# Patient Record
Sex: FEMALE | Race: OTHER | NOT HISPANIC OR LATINO | URBAN - METROPOLITAN AREA
[De-identification: names, ages, dates, MRNs, and addresses within clinical notes are randomized per-mention and may not be internally consistent; named-entity substitution may affect disease eponyms.]

---

## 2020-03-11 ENCOUNTER — EMERGENCY (EMERGENCY)
Facility: HOSPITAL | Age: 70
LOS: 1 days | Discharge: HOME | End: 2020-03-11
Attending: EMERGENCY MEDICINE
Payer: COMMERCIAL

## 2020-03-11 VITALS
HEART RATE: 62 BPM | SYSTOLIC BLOOD PRESSURE: 180 MMHG | RESPIRATION RATE: 18 BRPM | TEMPERATURE: 98 F | DIASTOLIC BLOOD PRESSURE: 61 MMHG | OXYGEN SATURATION: 100 %

## 2020-03-11 VITALS
SYSTOLIC BLOOD PRESSURE: 196 MMHG | TEMPERATURE: 98 F | OXYGEN SATURATION: 97 % | HEIGHT: 63 IN | DIASTOLIC BLOOD PRESSURE: 77 MMHG | HEART RATE: 81 BPM | RESPIRATION RATE: 18 BRPM | WEIGHT: 138.01 LBS

## 2020-03-11 PROCEDURE — 12011 RPR F/E/E/N/L/M 2.5 CM/<: CPT

## 2020-03-11 PROCEDURE — 99283 EMERGENCY DEPT VISIT LOW MDM: CPT | Mod: 25

## 2020-03-11 PROCEDURE — 90471 IMMUNIZATION ADMIN: CPT

## 2020-03-11 PROCEDURE — 90715 TDAP VACCINE 7 YRS/> IM: CPT

## 2020-03-11 RX ORDER — ACETAMINOPHEN 500 MG
975 TABLET ORAL ONCE
Refills: 0 | Status: COMPLETED | OUTPATIENT
Start: 2020-03-11 | End: 2020-03-11

## 2020-03-11 RX ORDER — TETANUS TOXOID, REDUCED DIPHTHERIA TOXOID AND ACELLULAR PERTUSSIS VACCINE, ADSORBED 5; 2.5; 8; 8; 2.5 [IU]/.5ML; [IU]/.5ML; UG/.5ML; UG/.5ML; UG/.5ML
0.5 SUSPENSION INTRAMUSCULAR ONCE
Refills: 0 | Status: COMPLETED | OUTPATIENT
Start: 2020-03-11 | End: 2020-03-11

## 2020-03-11 RX ADMIN — TETANUS TOXOID, REDUCED DIPHTHERIA TOXOID AND ACELLULAR PERTUSSIS VACCINE, ADSORBED 0.5 MILLILITER(S): 5; 2.5; 8; 8; 2.5 SUSPENSION INTRAMUSCULAR at 21:13

## 2020-03-11 NOTE — ED PROVIDER NOTE - OBJECTIVE STATEMENT
69y Female with hx of HTN, HLD presenting after mechanical fall with upper lip laceration. Patient was walking, tripped, fell forwards and landed with outstretched hands, striking her face on the ground. No LOC. Not on blood thinners. Complaining of right hand palm abrasions, right knee abrasion and upper lip laceration. No pain elsewhere. Has been able to ambulate since fall. Remembers everything that happened. No preceding chest pain, syncope, dyspnea, palpitations.

## 2020-03-11 NOTE — ED PROVIDER NOTE - ATTENDING CONTRIBUTION TO CARE
68 yo female with mechanical slip/fall.  + lac inside the mouth.  not on a/c.  no facial TTP, no HA, neuro intact, very well appearing.  lac repair, update tetanus.

## 2020-03-11 NOTE — ED PROVIDER NOTE - NSFOLLOWUPINSTRUCTIONS_ED_ALL_ED_FT
Your diagnosis: lip laceration    Discharge instructions:    - Please follow up with your Primary Care Doctor.    - Tylenol up to 650 mg every 8 hours as needed for pain and/or Motrin up to 600 mg every 6 hours as needed for pain.     - Be sure to return to the ED if you develop new or worsening symptoms. Specific signs and symptoms to be vigilant of: fever or chills, worsening lip pain/swelling/redness, lip drainage.

## 2020-03-11 NOTE — ED PROVIDER NOTE - PATIENT PORTAL LINK FT
You can access the FollowMyHealth Patient Portal offered by [facility] by registering at the following website: [email or web address]. By joining [employer]s FollowMyHealth portal, you will also be able to view your health information using other applications (apps) compatible with our system.

## 2020-03-11 NOTE — ED PROVIDER NOTE - CLINICAL SUMMARY MEDICAL DECISION MAKING FREE TEXT BOX
69y Female with hx of HTN, HLD presenting after mechanical fall with upper lip laceration, right hand and right knee abrasions. Will get CTH, max-face, neck, give adacel, and repair laceration. 69y Female with hx of HTN, HLD presenting after mechanical fall with upper lip laceration, right hand and right knee abrasions. Will give adacel, and repair laceration.

## 2020-03-11 NOTE — ED ADULT NURSE NOTE - OBJECTIVE STATEMENT
2020 pt 69yf aox4 walked in  triage states s/p fall tripped on an elevated sidewalk fell facedown, noted lip lac abrasion to inner lft eye and nasal area, abrasion to lft palm and rt knee, denies loc denies any blood thinners pending eval/gc

## 2020-03-11 NOTE — ED PROVIDER NOTE - PHYSICAL EXAMINATION
GENERAL: no acute distress, non-toxic appearing  HEAD: normocephalic, atraumatic  HEENT: 1 cm laceration to upper lip, no injury to dentition, no septal hematoma, mild ecchymosis around nose  CARDIAC: regular rate and rhythm, normal S1 and S2,  no appreciable murmurs  PULM: clear to ascultation bilaterally, no crackles, rales, rhonchi, or wheezing  GI: abdomen nondistended, soft, nontender, no guarding or rebound tenderness  NEURO: alert and oriented x 3, normal speech, PERRLA, EOMI, no focal motor or sensory deficits, nonantalgic gait  MSK: no visible deformities of arms or legs, no midline spinal ttp, able to range all joints  SKIN: abrasions to right palm and right knee  PSYCH: appropriate mood and affect

## 2020-03-11 NOTE — ED ADULT NURSE NOTE - CAS TRG GENERAL NORM CIRC DET
Problem: Patient Care Overview  Goal: Plan of Care Review  Outcome: Ongoing (interventions implemented as appropriate)  AAO x 4, VSS, no falls noted, fall precautions remain, skin intact, pain assessed, no pain noted, call light within reach, bed wheels locked, bed in lowest position, side rails x 2, safety maintained, NADN, will continue to monitor.       Strong peripheral pulses

## 2020-03-11 NOTE — ED PROVIDER NOTE - NS ED ROS FT
GENERAL: no fever, chills  HEENT: no cough, congestion, odynophagia, dysphagia  CARDIAC: no chest pain, palpitations, lightheadedness  PULM: no dyspnea, wheezing   GI: no abdominal pain, nausea, vomiting, diarrhea, constipation, melena, hematochezia  : no urinary dysuria, frequency, incontinence, hematuria  NEURO: no headache, motor weakness, sensory changes  MSK: no joint or muscle pain  SKIN: + abrasions to right palm, right knee, lip laceration  HEME: no active bleeding, bruising

## 2020-03-27 PROBLEM — Z00.00 ENCOUNTER FOR PREVENTIVE HEALTH EXAMINATION: Status: ACTIVE | Noted: 2020-03-27

## 2020-03-28 ENCOUNTER — INPATIENT (INPATIENT)
Facility: HOSPITAL | Age: 70
LOS: 2 days | Discharge: HOME | DRG: 378 | End: 2020-03-31
Attending: INTERNAL MEDICINE | Admitting: HOSPITALIST
Payer: COMMERCIAL

## 2020-03-28 VITALS
HEART RATE: 90 BPM | SYSTOLIC BLOOD PRESSURE: 159 MMHG | TEMPERATURE: 98 F | OXYGEN SATURATION: 100 % | DIASTOLIC BLOOD PRESSURE: 81 MMHG | WEIGHT: 139.99 LBS | HEIGHT: 62 IN

## 2020-03-28 DIAGNOSIS — K92.2 GASTROINTESTINAL HEMORRHAGE, UNSPECIFIED: ICD-10-CM

## 2020-03-28 LAB
ALBUMIN SERPL ELPH-MCNC: 3.9 G/DL — SIGNIFICANT CHANGE UP (ref 3.3–5)
ALP SERPL-CCNC: 64 U/L — SIGNIFICANT CHANGE UP (ref 40–120)
ALT FLD-CCNC: 22 U/L — SIGNIFICANT CHANGE UP (ref 10–45)
ANION GAP SERPL CALC-SCNC: 11 MMOL/L — SIGNIFICANT CHANGE UP (ref 5–17)
AST SERPL-CCNC: 22 U/L — SIGNIFICANT CHANGE UP (ref 10–40)
BASOPHILS # BLD AUTO: 0.05 K/UL — SIGNIFICANT CHANGE UP (ref 0–0.2)
BASOPHILS NFR BLD AUTO: 0.6 % — SIGNIFICANT CHANGE UP (ref 0–2)
BILIRUB SERPL-MCNC: 0.1 MG/DL — LOW (ref 0.2–1.2)
BLD GP AB SCN SERPL QL: NEGATIVE — SIGNIFICANT CHANGE UP
BUN SERPL-MCNC: 22 MG/DL — SIGNIFICANT CHANGE UP (ref 7–23)
CALCIUM SERPL-MCNC: 9.3 MG/DL — SIGNIFICANT CHANGE UP (ref 8.4–10.5)
CHLORIDE SERPL-SCNC: 104 MMOL/L — SIGNIFICANT CHANGE UP (ref 96–108)
CO2 SERPL-SCNC: 24 MMOL/L — SIGNIFICANT CHANGE UP (ref 22–31)
CREAT SERPL-MCNC: 0.96 MG/DL — SIGNIFICANT CHANGE UP (ref 0.5–1.3)
EOSINOPHIL # BLD AUTO: 0.39 K/UL — SIGNIFICANT CHANGE UP (ref 0–0.5)
EOSINOPHIL NFR BLD AUTO: 4.5 % — SIGNIFICANT CHANGE UP (ref 0–6)
GLUCOSE SERPL-MCNC: 212 MG/DL — HIGH (ref 70–99)
HCT VFR BLD CALC: 23.7 % — LOW (ref 34.5–45)
HGB BLD-MCNC: 7.8 G/DL — LOW (ref 11.5–15.5)
IMM GRANULOCYTES NFR BLD AUTO: 0.7 % — SIGNIFICANT CHANGE UP (ref 0–1.5)
LYMPHOCYTES # BLD AUTO: 3.3 K/UL — SIGNIFICANT CHANGE UP (ref 1–3.3)
LYMPHOCYTES # BLD AUTO: 38 % — SIGNIFICANT CHANGE UP (ref 13–44)
MCHC RBC-ENTMCNC: 31.5 PG — SIGNIFICANT CHANGE UP (ref 27–34)
MCHC RBC-ENTMCNC: 32.9 GM/DL — SIGNIFICANT CHANGE UP (ref 32–36)
MCV RBC AUTO: 95.6 FL — SIGNIFICANT CHANGE UP (ref 80–100)
MONOCYTES # BLD AUTO: 0.68 K/UL — SIGNIFICANT CHANGE UP (ref 0–0.9)
MONOCYTES NFR BLD AUTO: 7.8 % — SIGNIFICANT CHANGE UP (ref 2–14)
NEUTROPHILS # BLD AUTO: 4.2 K/UL — SIGNIFICANT CHANGE UP (ref 1.8–7.4)
NEUTROPHILS NFR BLD AUTO: 48.4 % — SIGNIFICANT CHANGE UP (ref 43–77)
NRBC # BLD: 0 /100 WBCS — SIGNIFICANT CHANGE UP (ref 0–0)
PLATELET # BLD AUTO: 268 K/UL — SIGNIFICANT CHANGE UP (ref 150–400)
POTASSIUM SERPL-MCNC: 4.2 MMOL/L — SIGNIFICANT CHANGE UP (ref 3.5–5.3)
POTASSIUM SERPL-SCNC: 4.2 MMOL/L — SIGNIFICANT CHANGE UP (ref 3.5–5.3)
PROT SERPL-MCNC: 6.7 G/DL — SIGNIFICANT CHANGE UP (ref 6–8.3)
RBC # BLD: 2.48 M/UL — LOW (ref 3.8–5.2)
RBC # FLD: 12.7 % — SIGNIFICANT CHANGE UP (ref 10.3–14.5)
RH IG SCN BLD-IMP: POSITIVE — SIGNIFICANT CHANGE UP
RH IG SCN BLD-IMP: POSITIVE — SIGNIFICANT CHANGE UP
SODIUM SERPL-SCNC: 139 MMOL/L — SIGNIFICANT CHANGE UP (ref 135–145)
WBC # BLD: 8.68 K/UL — SIGNIFICANT CHANGE UP (ref 3.8–10.5)
WBC # FLD AUTO: 8.68 K/UL — SIGNIFICANT CHANGE UP (ref 3.8–10.5)

## 2020-03-28 PROCEDURE — 93010 ELECTROCARDIOGRAM REPORT: CPT

## 2020-03-28 PROCEDURE — 99285 EMERGENCY DEPT VISIT HI MDM: CPT

## 2020-03-28 RX ORDER — PANTOPRAZOLE SODIUM 20 MG/1
80 TABLET, DELAYED RELEASE ORAL ONCE
Refills: 0 | Status: COMPLETED | OUTPATIENT
Start: 2020-03-28 | End: 2020-03-28

## 2020-03-28 RX ORDER — ACETAMINOPHEN 500 MG
975 TABLET ORAL ONCE
Refills: 0 | Status: COMPLETED | OUTPATIENT
Start: 2020-03-28 | End: 2020-03-28

## 2020-03-28 RX ADMIN — PANTOPRAZOLE SODIUM 80 MILLIGRAM(S): 20 TABLET, DELAYED RELEASE ORAL at 21:40

## 2020-03-28 NOTE — ED ADULT NURSE NOTE - OBJECTIVE STATEMENT
68yo female presents with dark stools x5 days. Pt was seen here 2 weeks ago after fall. Pt had persistent rib pain and had been taking Naprosyn for pain. No with 5 days of dark stools and abd pain. Reports feeling dizzy today and had near syncopal episode at home. On arrival to ED, pt ambulatory with steady gait. Denies cp/sob. Respirations even/unlabored. Abd soft. No n/v/d. Reports formed stool. Denies urinary symptoms. Skin WDI.

## 2020-03-28 NOTE — ED PROVIDER NOTE - OBJECTIVE STATEMENT
69 year old female with pmhx HTN, HLD presents to ED c/o dark stool x 5 days and chest pain. Pt reports mechanical fall approx 2 weeks ago which she sustained a laceration to her lip. Since has had intermittent chest pain worsened with cough. She states she also feels as if she has reflux at times. Was taking naproxen for pain but stopped. She noticed dark, black stools over the past 5 days. Denies fevers, chills, abd pain, n/v/d, anticoagulant use

## 2020-03-28 NOTE — ED PROVIDER NOTE - PHYSICAL EXAMINATION
CONSTITUTIONAL: Patient is awake, alert and oriented x 3. Patient is well appearing and in no acute distress.  HEAD: NCAT,   LUNGS: CTA B/L,  HEART: RRR.+S1S2 no murmurs,  ABDOMEN: Soft nd/nt+bs no rebound or guarding.   EXTREMITY: no edema or calf tenderness b/l, FROM upper and lower ext b/l  NEURO: No focal deficits

## 2020-03-28 NOTE — ED PROVIDER NOTE - ATTENDING CONTRIBUTION TO CARE
attending Julito: 69yF h/o HTN, HLD recently on NSAIDs after fall earlier this month p/w 5 days melena and 1 day profound generalized weakness and episodes of near syncope. No AC. Concern for upper GI bleed. Will obtain labs including type and screen, Protonix, reassess for need for transfusion/admission

## 2020-03-29 DIAGNOSIS — R07.9 CHEST PAIN, UNSPECIFIED: ICD-10-CM

## 2020-03-29 DIAGNOSIS — D62 ACUTE POSTHEMORRHAGIC ANEMIA: ICD-10-CM

## 2020-03-29 DIAGNOSIS — I10 ESSENTIAL (PRIMARY) HYPERTENSION: ICD-10-CM

## 2020-03-29 DIAGNOSIS — Z29.9 ENCOUNTER FOR PROPHYLACTIC MEASURES, UNSPECIFIED: ICD-10-CM

## 2020-03-29 DIAGNOSIS — E78.00 PURE HYPERCHOLESTEROLEMIA, UNSPECIFIED: ICD-10-CM

## 2020-03-29 DIAGNOSIS — Z02.9 ENCOUNTER FOR ADMINISTRATIVE EXAMINATIONS, UNSPECIFIED: ICD-10-CM

## 2020-03-29 DIAGNOSIS — K92.2 GASTROINTESTINAL HEMORRHAGE, UNSPECIFIED: ICD-10-CM

## 2020-03-29 LAB
ANION GAP SERPL CALC-SCNC: 12 MMOL/L — SIGNIFICANT CHANGE UP (ref 5–17)
APTT BLD: 28.8 SEC — SIGNIFICANT CHANGE UP (ref 27.5–36.3)
BUN SERPL-MCNC: 18 MG/DL — SIGNIFICANT CHANGE UP (ref 7–23)
CALCIUM SERPL-MCNC: 9.6 MG/DL — SIGNIFICANT CHANGE UP (ref 8.4–10.5)
CHLORIDE SERPL-SCNC: 106 MMOL/L — SIGNIFICANT CHANGE UP (ref 96–108)
CK MB CFR SERPL CALC: 1.5 NG/ML — SIGNIFICANT CHANGE UP (ref 0–3.8)
CK MB CFR SERPL CALC: 1.6 NG/ML — SIGNIFICANT CHANGE UP (ref 0–3.8)
CK SERPL-CCNC: 58 U/L — SIGNIFICANT CHANGE UP (ref 25–170)
CO2 SERPL-SCNC: 24 MMOL/L — SIGNIFICANT CHANGE UP (ref 22–31)
CREAT SERPL-MCNC: 0.88 MG/DL — SIGNIFICANT CHANGE UP (ref 0.5–1.3)
GLUCOSE SERPL-MCNC: 133 MG/DL — HIGH (ref 70–99)
HCT VFR BLD CALC: 26.7 % — LOW (ref 34.5–45)
HGB BLD-MCNC: 8.4 G/DL — LOW (ref 11.5–15.5)
INR BLD: 0.98 RATIO — SIGNIFICANT CHANGE UP (ref 0.88–1.16)
MCHC RBC-ENTMCNC: 29.4 PG — SIGNIFICANT CHANGE UP (ref 27–34)
MCHC RBC-ENTMCNC: 31.5 GM/DL — LOW (ref 32–36)
MCV RBC AUTO: 93.4 FL — SIGNIFICANT CHANGE UP (ref 80–100)
NRBC # BLD: 0 /100 WBCS — SIGNIFICANT CHANGE UP (ref 0–0)
PLATELET # BLD AUTO: 225 K/UL — SIGNIFICANT CHANGE UP (ref 150–400)
POTASSIUM SERPL-MCNC: 4.3 MMOL/L — SIGNIFICANT CHANGE UP (ref 3.5–5.3)
POTASSIUM SERPL-SCNC: 4.3 MMOL/L — SIGNIFICANT CHANGE UP (ref 3.5–5.3)
PROTHROM AB SERPL-ACNC: 11.3 SEC — SIGNIFICANT CHANGE UP (ref 10–13.1)
RBC # BLD: 2.86 M/UL — LOW (ref 3.8–5.2)
RBC # FLD: 13.2 % — SIGNIFICANT CHANGE UP (ref 10.3–14.5)
SODIUM SERPL-SCNC: 142 MMOL/L — SIGNIFICANT CHANGE UP (ref 135–145)
TROPONIN T, HIGH SENSITIVITY RESULT: 10 NG/L — SIGNIFICANT CHANGE UP (ref 0–51)
TROPONIN T, HIGH SENSITIVITY RESULT: 8 NG/L — SIGNIFICANT CHANGE UP (ref 0–51)
TROPONIN T, HIGH SENSITIVITY RESULT: 8 NG/L — SIGNIFICANT CHANGE UP (ref 0–51)
TROPONIN T, HIGH SENSITIVITY RESULT: 9 NG/L — SIGNIFICANT CHANGE UP (ref 0–51)
WBC # BLD: 7.98 K/UL — SIGNIFICANT CHANGE UP (ref 3.8–10.5)
WBC # FLD AUTO: 7.98 K/UL — SIGNIFICANT CHANGE UP (ref 3.8–10.5)

## 2020-03-29 PROCEDURE — 93010 ELECTROCARDIOGRAM REPORT: CPT

## 2020-03-29 PROCEDURE — 99223 1ST HOSP IP/OBS HIGH 75: CPT

## 2020-03-29 PROCEDURE — 74177 CT ABD & PELVIS W/CONTRAST: CPT | Mod: 26

## 2020-03-29 PROCEDURE — 99221 1ST HOSP IP/OBS SF/LOW 40: CPT | Mod: GC

## 2020-03-29 RX ORDER — ATORVASTATIN CALCIUM 80 MG/1
20 TABLET, FILM COATED ORAL AT BEDTIME
Refills: 0 | Status: DISCONTINUED | OUTPATIENT
Start: 2020-03-29 | End: 2020-03-31

## 2020-03-29 RX ORDER — ACETAMINOPHEN 500 MG
650 TABLET ORAL EVERY 6 HOURS
Refills: 0 | Status: DISCONTINUED | OUTPATIENT
Start: 2020-03-29 | End: 2020-03-31

## 2020-03-29 RX ORDER — ONDANSETRON 8 MG/1
4 TABLET, FILM COATED ORAL EVERY 8 HOURS
Refills: 0 | Status: DISCONTINUED | OUTPATIENT
Start: 2020-03-29 | End: 2020-03-31

## 2020-03-29 RX ORDER — PANTOPRAZOLE SODIUM 20 MG/1
40 TABLET, DELAYED RELEASE ORAL EVERY 12 HOURS
Refills: 0 | Status: DISCONTINUED | OUTPATIENT
Start: 2020-03-29 | End: 2020-03-30

## 2020-03-29 RX ORDER — LOSARTAN POTASSIUM 100 MG/1
50 TABLET, FILM COATED ORAL DAILY
Refills: 0 | Status: DISCONTINUED | OUTPATIENT
Start: 2020-03-29 | End: 2020-03-31

## 2020-03-29 RX ORDER — PANTOPRAZOLE SODIUM 20 MG/1
6.4 TABLET, DELAYED RELEASE ORAL
Qty: 80 | Refills: 0 | Status: DISCONTINUED | OUTPATIENT
Start: 2020-03-29 | End: 2020-03-31

## 2020-03-29 RX ORDER — PANTOPRAZOLE SODIUM 20 MG/1
80 TABLET, DELAYED RELEASE ORAL ONCE
Refills: 0 | Status: COMPLETED | OUTPATIENT
Start: 2020-03-29 | End: 2020-03-29

## 2020-03-29 RX ORDER — ATORVASTATIN CALCIUM 80 MG/1
1 TABLET, FILM COATED ORAL
Qty: 0 | Refills: 0 | DISCHARGE

## 2020-03-29 RX ORDER — ONDANSETRON 8 MG/1
4 TABLET, FILM COATED ORAL ONCE
Refills: 0 | Status: COMPLETED | OUTPATIENT
Start: 2020-03-29 | End: 2020-03-29

## 2020-03-29 RX ORDER — SODIUM CHLORIDE 9 MG/ML
1000 INJECTION, SOLUTION INTRAVENOUS
Refills: 0 | Status: DISCONTINUED | OUTPATIENT
Start: 2020-03-29 | End: 2020-03-31

## 2020-03-29 RX ORDER — LOSARTAN POTASSIUM 100 MG/1
1 TABLET, FILM COATED ORAL
Qty: 0 | Refills: 0 | DISCHARGE

## 2020-03-29 RX ADMIN — ONDANSETRON 4 MILLIGRAM(S): 8 TABLET, FILM COATED ORAL at 13:40

## 2020-03-29 RX ADMIN — PANTOPRAZOLE SODIUM 40 MILLIGRAM(S): 20 TABLET, DELAYED RELEASE ORAL at 06:09

## 2020-03-29 RX ADMIN — PANTOPRAZOLE SODIUM 40 MILLIGRAM(S): 20 TABLET, DELAYED RELEASE ORAL at 16:05

## 2020-03-29 RX ADMIN — Medication 1 TABLET(S): at 16:04

## 2020-03-29 RX ADMIN — PANTOPRAZOLE SODIUM 8 MG/HR: 20 TABLET, DELAYED RELEASE ORAL at 21:14

## 2020-03-29 RX ADMIN — LOSARTAN POTASSIUM 50 MILLIGRAM(S): 100 TABLET, FILM COATED ORAL at 08:52

## 2020-03-29 RX ADMIN — SODIUM CHLORIDE 75 MILLILITER(S): 9 INJECTION, SOLUTION INTRAVENOUS at 16:02

## 2020-03-29 RX ADMIN — PANTOPRAZOLE SODIUM 80 MILLIGRAM(S): 20 TABLET, DELAYED RELEASE ORAL at 19:00

## 2020-03-29 RX ADMIN — ATORVASTATIN CALCIUM 20 MILLIGRAM(S): 80 TABLET, FILM COATED ORAL at 21:13

## 2020-03-29 NOTE — H&P ADULT - PROBLEM SELECTOR PLAN 7
Transitions of Care Status:  1.  Name of PCP: Dr. Checo Spann (PMD)  2.  PCP Contacted on Admission: [ ] Y    [x] N    3.  PCP contacted at Discharge: [ ] Y    [ ] N    [ ] N/A  4.  Post-Discharge Appointment Date and Location:  5.  Summary of Handoff given to PCP:

## 2020-03-29 NOTE — H&P ADULT - HISTORY OF PRESENT ILLNESS
69F PMH HTN, HLD, ?prediabetes p/w melena. 69F PMH HTN, HLD, ?prediabetes p/w melena. Patient had mechanical fall 2 weeks ago where [place], [place] forward onto her chest, face, and wrists. Was evaluated in the ED, received sutures to upper lip, and discharged. Developed b/l chest pain after that, occasionally radiating to her shoulders and back, nearly constant, sometime severe, worse with movement (getting up, lying down), not associated with exertion. Was started on naprosyn by her PMD, mild improvement in pain. 5 days ago, developed black, loose stools. Typically 2-3 BMs per day. Has had black stools every day since. Chest pain has continued. Now feeling very fatigued. Had one transient episode up epigastric pain one night that resolved with one dose of PPI. Has been eating normally without issue. No nausea, vomiting, dizziness, palpitations, LE edema. No h/o GI bleeding. Had normal colonoscopy more than 5 years ago. Reports normal CXR conducted 2 days ago outpatient to assess for rib fx.     She has not had cough, fevers, URI symptoms, travel, sick contacts, or known exposure to COVID19+ peoples. Has been self-isolating with .

## 2020-03-29 NOTE — H&P ADULT - NSHPLABSRESULTS_GEN_ALL_CORE
EKG and labs personally reviewed and interpreted.     EKG: NSR, nonspecific T wave abnormalities (unchanged from prior EKG), QTc 405    LABS:                        7.8    8.68  )-----------( 268      ( 28 Mar 2020 22:31 )             23.7     139  |  104  |  22  ----------------------------<  212<H>  4.2   |  24  |  0.96    Ca    9.3      28 Mar 2020 22:31    TPro  6.7  /  Alb  3.9  /  TBili  0.1<L>  /  DBili  x   /  AST  22  /  ALT  22  /  AlkPhos  64  03-28    RADIOLOGY & ADDITIONAL TESTS:  Imaging Personally Reviewed:    Consultant(s) Notes Reviewed:  Yes  Care Discussed with Consultants/Other Providers: Yes  Outpatient and prior hospitalization records reviewed: Yes

## 2020-03-29 NOTE — H&P ADULT - PROBLEM SELECTOR PLAN 2
--suspect UGIB given recent, new NSAID use  --GI consult emailed to assess whether EGD should be performed inpatient vs pt stabilized and defer to outpatient given COVID19 outbreak  --trend CBC following transfusion  --c/w PPI IV BID  --CLD for now as hemodynamics stable and unlikely to have EGD on Sunday.

## 2020-03-29 NOTE — CONSULT NOTE ADULT - SUBJECTIVE AND OBJECTIVE BOX
Chief Complaint:  Patient is a 69y old  Female who presents with a chief complaint of melena (29 Mar 2020 01:53)      HPI:  69 year old female with HTN, HLD, prediabetes presented to the emergency room with chief complain of melena for 5 days.     As per notes, patient had a mechanical fall 2 weeks ago where she [place], fell forward onto her chest, face, and wrists. She was evaluated in the ED, received sutures to upper lip, and was discharged. She developed b/l chest pain and was started on naprosyn by her PMD. She then developed black, loose stools, typically 2-3 BMs per day. She also complains of fatigue and had one transient episode up epigastric pain one night that resolved with one dose of PPI. She has been eating normally without issue. She denies nausea, vomiting, dizziness, palpitations, LE edema.     She had a normal colonoscopy more than 5 years ago. She has never had any prior episodes of GI bleeding. She denies use of blood thinners. She has been taking NSAIDs recently.     Allergies:  No Known Allergies      Home Medications:  Patient Currently Takes Medications as of 29-Mar-2020 01:33 documented in Structured Notes  · 	Naprosyn 375 mg oral tablet: Last Dose Taken:  , 1 tab(s) orally 2 times a day  · 	losartan 50 mg oral tablet: Last Dose Taken:  , 1 tab(s) orally once a day  · 	hydroCHLOROthiazide 12.5 mg oral tablet: Last Dose Taken:  , 1 tab(s) orally once a day  · 	Lipitor 20 mg oral tablet: Last Dose Taken:  , 1 tab(s) orally once a day  · 	Calcium 600+D 600 mg-200 intl units (5 mcg) oral tablet: Last Dose Taken:  , 1 tab(s) orally 2 times a day      Hospital Medications:  acetaminophen   Tablet .. 650 milliGRAM(s) Oral every 6 hours PRN  atorvastatin 20 milliGRAM(s) Oral at bedtime  calcium carbonate 1250 mG  + Vitamin D (OsCal 500 + D) 1 Tablet(s) Oral daily  losartan 50 milliGRAM(s) Oral daily  pantoprazole  Injectable 40 milliGRAM(s) IV Push every 12 hours      PMHX/PSHX:  Hypertension  Hypercholesteremia  Breast lump      Family history:  No pertinent family history in first degree relatives  Denies family history of colon cancer, liver cancer, uterine cancer, ovarian cancer, breast cancer, gastric ulcers, colon polyps, gallstones    Social History:   lives with , retired nurse  independent ADLs, no toxic habits      PHYSICAL EXAM:   patient was not examined by me to limit exposure during COVID pandemic     Vital Signs:  Vital Signs Last 24 Hrs  T(C): 36.5 (29 Mar 2020 08:50), Max: 37.1 (29 Mar 2020 01:48)  T(F): 97.7 (29 Mar 2020 08:50), Max: 98.8 (29 Mar 2020 01:48)  HR: 65 (29 Mar 2020 08:50) (61 - 90)  BP: 153/89 (29 Mar 2020 08:50) (110/66 - 159/81)  BP(mean): --  RR: 18 (29 Mar 2020 08:50) (17 - 19)  SpO2: 99% (29 Mar 2020 08:50) (99% - 100%)  Daily Height in cm: 157.48 (28 Mar 2020 20:12)    Daily     LABS:                        8.4    7.98  )-----------( 225      ( 29 Mar 2020 08:05 )             26.7     03-29    142  |  106  |  18  ----------------------------<  133<H>  4.3   |  24  |  0.88    Ca    9.6      29 Mar 2020 08:04    TPro  6.7  /  Alb  3.9  /  TBili  0.1<L>  /  DBili  x   /  AST  22  /  ALT  22  /  AlkPhos  64  03-28    LIVER FUNCTIONS - ( 28 Mar 2020 22:31 )  Alb: 3.9 g/dL / Pro: 6.7 g/dL / ALK PHOS: 64 U/L / ALT: 22 U/L / AST: 22 U/L / GGT: x           PT/INR - ( 29 Mar 2020 09:14 )   PT: 11.3 sec;   INR: 0.98 ratio         PTT - ( 29 Mar 2020 09:14 )  PTT:28.8 sec        Imaging:

## 2020-03-29 NOTE — CONSULT NOTE ADULT - ATTENDING COMMENTS
Agree with above. Anemia with melena most likely due to NSAID induced gastropathy. Hemodynamically stable. Agree with PPI tx and trend CBC. If continues to drop, consider EGD.

## 2020-03-29 NOTE — H&P ADULT - PROBLEM SELECTOR PLAN 1
--Hgb 7.8 from baseline 13s   --ED transfusing 2 units pRBC  --not on blood thinners  --likely 2/2 UGIB

## 2020-03-29 NOTE — H&P ADULT - PROBLEM SELECTOR PLAN 3
--low suspicion for ACS. Chest wall is tender. Likely secondary to fall. EKG nonischemic. Trop 8. Can check 2nd trop with next lab draw.   --No other COVID symptoms such as fever, cough, myalgias, URI symptoms, neg outpt xray two days ago.   --tylenol for pain.

## 2020-03-29 NOTE — CONSULT NOTE ADULT - ASSESSMENT
69 year old female with HTN, HLD, prediabetes presented to the emergency room with chief complain of melena.     IMPRESSION  - Acute blood loss anemia with melena, Hb 7.8 now s/p 2u pRBC  Likely 2/2 NSAID gastropathy, PUD, vs esophagitis, erosive gastritis, angioectasia    RECOMMENDATION    - trend CBC, CMP, INR, transfuse as needed  - start pantoprazole with bolus 80mg IV followed by infusion at 8mg/hr  - clear liquid diet for now  - please hold NSAIDs  - please send Hpylori serology, if positive then will need to be treated with triple therapy   - supportive care as per primary team    Shaye Valencia, PGY-6  GI fellow  B- [id number]/ [phone]  Please call GI fellow on call after 5pm and on weekends 69 year old female with HTN, HLD, prediabetes presented to the emergency room with chief complain of melena.     IMPRESSION  - Acute blood loss anemia with melena, Hb 7.8 now s/p 2u pRBC  Likely 2/2 NSAID gastropathy, PUD, vs esophagitis, erosive gastritis, angioectasia    - Epigastric and chest pain, likely 2/2 trauma but need to rule out cardiac causes before proceeding with GI workup    RECOMMENDATION    - trend CBC, CMP, INR, transfuse as needed  - start pantoprazole with bolus 80mg IV followed by infusion at 8mg/hr  - clear liquid diet for now  - please hold NSAIDs  - please send Hpylori serology, if positive then will need to be treated with triple therapy   - please check CT abdomen/pelvis with IV/PO contrast  - please obtain an EKG, given persistent epigastric and chest pain   - supportive care as per primary team    Shaye Valencia, PGY-6  GI fellow  B- [id number]/ [phone]  Please call GI fellow on call after 5pm and on weekends

## 2020-03-29 NOTE — CHART NOTE - NSCHARTNOTEFT_GEN_A_CORE
Pt with c/o Epigastric discomfort. Pt seen and examined at bedside. Pt with c/o epigastric discomfort. Stated that it is intermittent.   Spoke with GI Fellow Dr Valencia who recommended CT A/P to determine  reason for discomfort, Protonix drip, Twelve Lead EKG. Also discussed with Medical Attending Dr Dickerson who recommended Cardiac Enzymes x 2.CT Scan and CE negative. Pt in no acute distress.   Will continue to monitor. Endorsed to Night PA for further care.

## 2020-03-29 NOTE — H&P ADULT - NSHPPHYSICALEXAM_GEN_ALL_CORE
Vital Signs Last 24 Hrs  T(C): 36.7 (03-28-20 @ 20:12)  T(F): 98.1 (03-28-20 @ 20:12), Max: 98.1 (03-28-20 @ 20:12)  HR: 90 (03-28-20 @ 20:12) (90 - 90)  BP: 159/81 (03-28-20 @ 20:12)  BP(mean): --  RR: --  SpO2: 100% (03-28-20 @ 20:12) (100% - 100%)  Wt(kg): --    PHYSICAL EXAM:  GENERAL: NAD, well-developed  HEAD:  Atraumatic, Normocephalic  EYES: EOMI, PERRLA, conjunctiva and sclera clear  NECK: Supple, No JVD  CHEST/LUNG: Clear to auscultation bilaterally; No wheeze; chest wall tender to palpation  HEART: Regular rate and rhythm; No murmurs, rubs, or gallops  ABDOMEN: Soft, Nontender, Nondistended; Bowel sounds present  EXTREMITIES:  2+ Peripheral Pulses, No clubbing, cyanosis, or edema  PSYCH: AAOx3  NEUROLOGY: non-focal  SKIN: No rashes or lesions

## 2020-03-29 NOTE — H&P ADULT - NSHPREVIEWOFSYSTEMS_GEN_ALL_CORE
Review of Systems:   CONSTITUTIONAL: +fatigue; No fever, weight loss  EYES: No eye pain, visual disturbances, or discharge  ENMT:  No difficulty hearing, tinnitus, vertigo; No sinus or throat pain  NECK: No pain or stiffness  RESPIRATORY: No cough, wheezing, chills or hemoptysis; No shortness of breath  CARDIOVASCULAR: +chest wall pain; No palpitations, dizziness, or leg swelling  GASTROINTESTINAL: +melena; No abdominal or epigastric pain. No nausea, vomiting, or hematemesis; No diarrhea or constipation. No hematochezia.  GENITOURINARY: No dysuria, frequency, hematuria, or incontinence  NEUROLOGICAL: No headaches, memory loss, loss of strength, numbness, or tremors  SKIN: No itching, burning, rashes, or lesions   MUSCULOSKELETAL: No joint pain or swelling; No muscle, back, or extremity pain  PSYCHIATRIC: No depression, anxiety, mood swings, or difficulty sleeping  HEME/LYMPH: No easy bruising, or bleeding gums  ALLERGY AND IMMUNOLOGIC: No hives or eczema

## 2020-03-30 LAB
ALBUMIN SERPL ELPH-MCNC: 3.6 G/DL — SIGNIFICANT CHANGE UP (ref 3.3–5)
ALP SERPL-CCNC: 59 U/L — SIGNIFICANT CHANGE UP (ref 40–120)
ALT FLD-CCNC: 19 U/L — SIGNIFICANT CHANGE UP (ref 10–45)
ANION GAP SERPL CALC-SCNC: 12 MMOL/L — SIGNIFICANT CHANGE UP (ref 5–17)
AST SERPL-CCNC: 19 U/L — SIGNIFICANT CHANGE UP (ref 10–40)
BASOPHILS # BLD AUTO: 0.04 K/UL — SIGNIFICANT CHANGE UP (ref 0–0.2)
BASOPHILS NFR BLD AUTO: 0.7 % — SIGNIFICANT CHANGE UP (ref 0–2)
BILIRUB SERPL-MCNC: 0.7 MG/DL — SIGNIFICANT CHANGE UP (ref 0.2–1.2)
BUN SERPL-MCNC: 13 MG/DL — SIGNIFICANT CHANGE UP (ref 7–23)
CALCIUM SERPL-MCNC: 9.4 MG/DL — SIGNIFICANT CHANGE UP (ref 8.4–10.5)
CHLORIDE SERPL-SCNC: 105 MMOL/L — SIGNIFICANT CHANGE UP (ref 96–108)
CO2 SERPL-SCNC: 26 MMOL/L — SIGNIFICANT CHANGE UP (ref 22–31)
CREAT SERPL-MCNC: 1.04 MG/DL — SIGNIFICANT CHANGE UP (ref 0.5–1.3)
EOSINOPHIL # BLD AUTO: 0.34 K/UL — SIGNIFICANT CHANGE UP (ref 0–0.5)
EOSINOPHIL NFR BLD AUTO: 5.6 % — SIGNIFICANT CHANGE UP (ref 0–6)
GLUCOSE SERPL-MCNC: 140 MG/DL — HIGH (ref 70–99)
HCT VFR BLD CALC: 29.6 % — LOW (ref 34.5–45)
HCV AB S/CO SERPL IA: 0.23 S/CO — SIGNIFICANT CHANGE UP (ref 0–0.99)
HCV AB SERPL-IMP: SIGNIFICANT CHANGE UP
HGB BLD-MCNC: 9.6 G/DL — LOW (ref 11.5–15.5)
IMM GRANULOCYTES NFR BLD AUTO: 0.2 % — SIGNIFICANT CHANGE UP (ref 0–1.5)
LYMPHOCYTES # BLD AUTO: 2.55 K/UL — SIGNIFICANT CHANGE UP (ref 1–3.3)
LYMPHOCYTES # BLD AUTO: 41.9 % — SIGNIFICANT CHANGE UP (ref 13–44)
MCHC RBC-ENTMCNC: 29.9 PG — SIGNIFICANT CHANGE UP (ref 27–34)
MCHC RBC-ENTMCNC: 32.4 GM/DL — SIGNIFICANT CHANGE UP (ref 32–36)
MCV RBC AUTO: 92.2 FL — SIGNIFICANT CHANGE UP (ref 80–100)
MONOCYTES # BLD AUTO: 0.5 K/UL — SIGNIFICANT CHANGE UP (ref 0–0.9)
MONOCYTES NFR BLD AUTO: 8.2 % — SIGNIFICANT CHANGE UP (ref 2–14)
NEUTROPHILS # BLD AUTO: 2.65 K/UL — SIGNIFICANT CHANGE UP (ref 1.8–7.4)
NEUTROPHILS NFR BLD AUTO: 43.4 % — SIGNIFICANT CHANGE UP (ref 43–77)
NRBC # BLD: 0 /100 WBCS — SIGNIFICANT CHANGE UP (ref 0–0)
PLATELET # BLD AUTO: 238 K/UL — SIGNIFICANT CHANGE UP (ref 150–400)
POTASSIUM SERPL-MCNC: 4.2 MMOL/L — SIGNIFICANT CHANGE UP (ref 3.5–5.3)
POTASSIUM SERPL-SCNC: 4.2 MMOL/L — SIGNIFICANT CHANGE UP (ref 3.5–5.3)
PROT SERPL-MCNC: 5.7 G/DL — LOW (ref 6–8.3)
RBC # BLD: 3.21 M/UL — LOW (ref 3.8–5.2)
RBC # FLD: 14.2 % — SIGNIFICANT CHANGE UP (ref 10.3–14.5)
SODIUM SERPL-SCNC: 143 MMOL/L — SIGNIFICANT CHANGE UP (ref 135–145)
WBC # BLD: 6.09 K/UL — SIGNIFICANT CHANGE UP (ref 3.8–10.5)
WBC # FLD AUTO: 6.09 K/UL — SIGNIFICANT CHANGE UP (ref 3.8–10.5)

## 2020-03-30 PROCEDURE — 99232 SBSQ HOSP IP/OBS MODERATE 35: CPT | Mod: GC

## 2020-03-30 RX ADMIN — SODIUM CHLORIDE 75 MILLILITER(S): 9 INJECTION, SOLUTION INTRAVENOUS at 21:22

## 2020-03-30 RX ADMIN — PANTOPRAZOLE SODIUM 8 MG/HR: 20 TABLET, DELAYED RELEASE ORAL at 18:00

## 2020-03-30 RX ADMIN — ATORVASTATIN CALCIUM 20 MILLIGRAM(S): 80 TABLET, FILM COATED ORAL at 21:22

## 2020-03-30 RX ADMIN — Medication 650 MILLIGRAM(S): at 01:49

## 2020-03-30 RX ADMIN — Medication 650 MILLIGRAM(S): at 00:49

## 2020-03-30 RX ADMIN — Medication 1 TABLET(S): at 11:35

## 2020-03-30 RX ADMIN — PANTOPRAZOLE SODIUM 8 MG/HR: 20 TABLET, DELAYED RELEASE ORAL at 05:08

## 2020-03-30 RX ADMIN — SODIUM CHLORIDE 75 MILLILITER(S): 9 INJECTION, SOLUTION INTRAVENOUS at 05:09

## 2020-03-30 NOTE — PROGRESS NOTE ADULT - PROBLEM SELECTOR PLAN 3
--low suspicion for ACS. Chest wall is tender. Likely secondary to fall. EKG nonischemic. Trop 8. Can check 2nd trop with next lab draw.   --No other COVID symptoms such as fever, cough, myalgias, URI symptoms, neg outpt xray two days ago.   --tylenol for pain.  - serial CE and EKG
--low suspicion for ACS. Chest wall is tender. Likely secondary to fall. EKG nonischemic. Trop 8. Can check 2nd trop with next lab draw.   --No other COVID symptoms such as fever, cough, myalgias, URI symptoms, neg outpt xray two days ago.   --tylenol for pain.  - serial CE and EKG

## 2020-03-30 NOTE — PROGRESS NOTE ADULT - SUBJECTIVE AND OBJECTIVE BOX
Chief Complaint:  Patient is a 69y old  Female who presents with a chief complaint of melena (29 Mar 2020 17:49)      Interval Events:   - patient had a CT abdomen done yesterday    Allergies:  No Known Allergies      Hospital Medications:  acetaminophen   Tablet .. 650 milliGRAM(s) Oral every 6 hours PRN  acetaminophen   Tablet .. 650 milliGRAM(s) Oral every 6 hours PRN  atorvastatin 20 milliGRAM(s) Oral at bedtime  calcium carbonate 1250 mG  + Vitamin D (OsCal 500 + D) 1 Tablet(s) Oral daily  dextrose 5% + sodium chloride 0.45%. 1000 milliLiter(s) IV Continuous <Continuous>  losartan 50 milliGRAM(s) Oral daily  ondansetron Injectable 4 milliGRAM(s) IV Push every 8 hours PRN  pantoprazole Infusion 6.4 mG/Hr IV Continuous <Continuous>      PMHX/PSHX:  Hypertension  Hypercholesteremia  Breast lump      Family history:  No pertinent family history in first degree relatives    Physical Exam: not examined today due to covid pandemic     Vital Signs:  Vital Signs Last 24 Hrs  T(C): 36.4 (30 Mar 2020 04:37), Max: 36.6 (29 Mar 2020 12:35)  T(F): 97.5 (30 Mar 2020 04:37), Max: 97.9 (29 Mar 2020 12:35)  HR: 56 (30 Mar 2020 04:37) (56 - 65)  BP: 119/69 (30 Mar 2020 04:37) (105/59 - 153/89)  BP(mean): --  RR: 18 (30 Mar 2020 04:37) (18 - 18)  SpO2: 97% (30 Mar 2020 04:37) (97% - 99%)  Daily     Daily     LABS:                        9.6    6.09  )-----------( 238      ( 30 Mar 2020 06:39 )             29.6     03-30    143  |  105  |  13  ----------------------------<  140<H>  4.2   |  26  |  1.04    Ca    9.4      30 Mar 2020 06:38    TPro  5.7<L>  /  Alb  3.6  /  TBili  0.7  /  DBili  x   /  AST  19  /  ALT  19  /  AlkPhos  59  03-30    LIVER FUNCTIONS - ( 30 Mar 2020 06:38 )  Alb: 3.6 g/dL / Pro: 5.7 g/dL / ALK PHOS: 59 U/L / ALT: 19 U/L / AST: 19 U/L / GGT: x         PT/INR - ( 29 Mar 2020 09:14 )   PT: 11.3 sec;   INR: 0.98 ratio    PTT - ( 29 Mar 2020 09:14 )  PTT:28.8 sec    Imaging:      < from: CT Abdomen and Pelvis w/ Oral Cont and w/ IV Cont (03.29.20 @ 14:25) >  EXAM:  CT ABDOMEN AND PELVIS OC IC                        PROCEDURE DATE:  03/29/2020    INTERPRETATION:  CLINICAL INFORMATION: Epigastric discomfort, status post fall 2 weeks ago.  COMPARISON: None.    PROCEDURE:   CT of the Abdomen and Pelvis was performed with intravenous contrast.   Intravenous contrast: 90 ml Omnipaque 350. 10 ml discarded.  Oral contrast: positive contrast was administered.  Sagittal and coronal reformats were performed.    FINDINGS:  LOWER CHEST: Within normal limits.    LIVER: Low-attenuation foci in the left hepatic lobe too small to characterize but probably a cyst.  BILE DUCTS: Normal caliber.  GALLBLADDER: Within normal limits.  SPLEEN: Within normal limits.  PANCREAS: Within normal limits.  ADRENALS: Within normal limits.  KIDNEYS/URETERS: Bilateral low-attenuation foci to small to characterize. No hydronephrosis.    BLADDER: Within normal limits.  REPRODUCTIVE ORGANS: No adnexal mass. The endometrial cavity is distended to 8mm in this postmenopausal patient.    BOWEL: No bowel obstruction. Appendix is normal.  PERITONEUM: No ascites.  VESSELS: Atherosclerotic changes.  RETROPERITONEUM/LYMPH NODES: No lymphadenopathy.    ABDOMINAL WALL: Within normal limits.  BONES: Degenerative changes.    IMPRESSION:   No acute intra-abdominal pathology.  Distended endometrial cavity in this postmenopausal patient for which a pelvic ultrasound is advised on outpatient basis.  < end of copied text >
Subjective: Patient seen and examined. No new events except as noted.   on and off chest and abdominal pain       REVIEW OF SYSTEMS:    CONSTITUTIONAL: No weakness, fevers or chills  EYES/ENT: No visual changes;  No vertigo or throat pain   NECK: No pain or stiffness  RESPIRATORY: No cough, wheezing, hemoptysis; No shortness of breath  CARDIOVASCULAR: No chest pain or palpitations  GASTROINTESTINAL: abdominal pain, vomiting  GENITOURINARY: No dysuria, frequency or hematuria  NEUROLOGICAL: No numbness or weakness  SKIN: No itching, burning, rashes, or lesions   All other review of systems is negative unless indicated above.    MEDICATIONS:  MEDICATIONS  (STANDING):  atorvastatin 20 milliGRAM(s) Oral at bedtime  calcium carbonate 1250 mG  + Vitamin D (OsCal 500 + D) 1 Tablet(s) Oral daily  dextrose 5% + sodium chloride 0.45%. 1000 milliLiter(s) (75 mL/Hr) IV Continuous <Continuous>  losartan 50 milliGRAM(s) Oral daily  pantoprazole  Injectable 40 milliGRAM(s) IV Push every 12 hours      PHYSICAL EXAM:  T(C): 36.6 (03-29-20 @ 17:00), Max: 37.1 (03-29-20 @ 01:48)  HR: 60 (03-29-20 @ 17:00) (60 - 90)  BP: 105/59 (03-29-20 @ 17:00) (105/59 - 159/81)  RR: 18 (03-29-20 @ 17:00) (17 - 19)  SpO2: 99% (03-29-20 @ 17:00) (97% - 100%)  Wt(kg): --  I&O's Summary    28 Mar 2020 07:01  -  29 Mar 2020 07:00  --------------------------------------------------------  IN: 590 mL / OUT: 0 mL / NET: 590 mL    29 Mar 2020 07:01  -  29 Mar 2020 17:51  --------------------------------------------------------  IN: 600 mL / OUT: 0 mL / NET: 600 mL      Height (cm): 157.48 (03-28 @ 20:12)  Weight (kg): 63.5 (03-28 @ 20:12)  BMI (kg/m2): 25.6 (03-28 @ 20:12)  BSA (m2): 1.64 (03-28 @ 20:12)    Appearance: Normal	  HEENT:  PERRLA   Lymphatic: No lymphadenopathy   Cardiovascular: Normal S1 S2, no JVD  Respiratory: normal effort , clear, pain on chest wall palpation  Gastrointestinal:  Soft, Non-tender  Skin: No rashes,  warm to touch  Psychiatry:  Mood & affect appropriate  Ext: No edema      All labs, Imaging and EKGs personally reviewed                           8.4    7.98  )-----------( 225      ( 29 Mar 2020 08:05 )             26.7               03-29    142  |  106  |  18  ----------------------------<  133<H>  4.3   |  24  |  0.88    Ca    9.6      29 Mar 2020 08:04    TPro  6.7  /  Alb  3.9  /  TBili  0.1<L>  /  DBili  x   /  AST  22  /  ALT  22  /  AlkPhos  64  03-28    PT/INR - ( 29 Mar 2020 09:14 )   PT: 11.3 sec;   INR: 0.98 ratio         PTT - ( 29 Mar 2020 09:14 )  PTT:28.8 sec       CARDIAC MARKERS ( 29 Mar 2020 16:22 )  x     / x     / 58 U/L / x     / 1.5 ng/mL
Subjective: Patient seen and examined. No new events except as noted.   doing better today  no more epigastric and chest discomfort   CT noted     REVIEW OF SYSTEMS:    CONSTITUTIONAL: No weakness, fevers or chills  EYES/ENT: No visual changes;  No vertigo or throat pain   NECK: No pain or stiffness  RESPIRATORY: No cough, wheezing, hemoptysis; No shortness of breath  CARDIOVASCULAR: No chest pain or palpitations  GASTROINTESTINAL: No abdominal or epigastric pain. No nausea, vomiting, or hematemesis; No diarrhea or constipation. No melena or hematochezia.  GENITOURINARY: No dysuria, frequency or hematuria  NEUROLOGICAL: No numbness or weakness  SKIN: No itching, burning, rashes, or lesions   All other review of systems is negative unless indicated above.    MEDICATIONS:  MEDICATIONS  (STANDING):  atorvastatin 20 milliGRAM(s) Oral at bedtime  calcium carbonate 1250 mG  + Vitamin D (OsCal 500 + D) 1 Tablet(s) Oral daily  dextrose 5% + sodium chloride 0.45%. 1000 milliLiter(s) (75 mL/Hr) IV Continuous <Continuous>  losartan 50 milliGRAM(s) Oral daily  pantoprazole Infusion 6.4 mG/Hr (8 mL/Hr) IV Continuous <Continuous>      PHYSICAL EXAM:  T(C): 36.4 (03-30-20 @ 17:53), Max: 36.6 (03-29-20 @ 20:41)  HR: 62 (03-30-20 @ 17:53) (56 - 62)  BP: 133/73 (03-30-20 @ 17:53) (109/69 - 133/73)  RR: 18 (03-30-20 @ 17:53) (18 - 18)  SpO2: 99% (03-30-20 @ 17:53) (97% - 99%)  Wt(kg): --  I&O's Summary    29 Mar 2020 07:01  -  30 Mar 2020 07:00  --------------------------------------------------------  IN: 1900 mL / OUT: 0 mL / NET: 1900 mL    30 Mar 2020 07:01  -  30 Mar 2020 19:36  --------------------------------------------------------  IN: 1480 mL / OUT: 0 mL / NET: 1480 mL          Appearance: Normal	  HEENT:  PERRLA   Lymphatic: No lymphadenopathy   Cardiovascular: Normal S1 S2, no JVD  Respiratory: normal effort , clear  Gastrointestinal:  Soft, Non-tender  Skin: No rashes,  warm to touch  Psychiatry:  Mood & affect appropriate  Ext: No edema      All labs, Imaging and EKGs personally reviewed                           9.6    6.09  )-----------( 238      ( 30 Mar 2020 06:39 )             29.6               03-30    143  |  105  |  13  ----------------------------<  140<H>  4.2   |  26  |  1.04    Ca    9.4      30 Mar 2020 06:38    TPro  5.7<L>  /  Alb  3.6  /  TBili  0.7  /  DBili  x   /  AST  19  /  ALT  19  /  AlkPhos  59  03-30    PT/INR - ( 29 Mar 2020 09:14 )   PT: 11.3 sec;   INR: 0.98 ratio         PTT - ( 29 Mar 2020 09:14 )  PTT:28.8 sec       CARDIAC MARKERS ( 29 Mar 2020 16:22 )  x     / x     / 58 U/L / x     / 1.5 ng/mL              < from: CT Abdomen and Pelvis w/ Oral Cont and w/ IV Cont (03.29.20 @ 14:25) >  IMPRESSION:     No acute intra-abdominal pathology.  Distended endometrial cavity in this postmenopausal patient for which a pelvic ultrasound is advised on outpatient basis.

## 2020-03-30 NOTE — PROGRESS NOTE ADULT - ASSESSMENT
69 year old female with HTN, HLD, prediabetes presented to the emergency room with chief complain of melena.     IMPRESSION  - Acute blood loss anemia with melena, Hb 7.8-->9.6 now s/p 2u pRBC  Likely 2/2 NSAID gastropathy, PUD, vs esophagitis, erosive gastritis, angioectasia    - Epigastric and chest pain, likely 2/2 trauma: EKG and cardiac troponins nrl, CT abdomen with no cause of abdominal pain identified     RECOMMENDATION    - trend CBC, CMP, INR, transfuse as needed  - continue pantoprazole infusion at 8mg/hr  - clear liquid diet for now  - please hold NSAIDs  - please send Hpylori serology, if positive then will need to be treated with triple therapy   - supportive care as per primary team    Shaye Valencia, PGY-6  GI fellow  B- [id number]/ [phone]  Please call GI fellow on call after 5pm and on weekends 69 year old female with HTN, HLD, prediabetes presented to the emergency room with chief complain of melena.     IMPRESSION  - Acute blood loss anemia with melena, Hb 7.8-->9.6 now s/p 2u pRBC  Likely 2/2 NSAID gastropathy, PUD, vs esophagitis, erosive gastritis, angioectasia    - Epigastric and chest pain, likely 2/2 trauma: EKG and cardiac troponins nrl, CT abdomen with no cause of abdominal pain identified     RECOMMENDATION    - trend CBC, CMP, INR, transfuse as needed  - continue pantoprazole infusion at 8mg/hr  - clear liquid diet for now, advance as tolerated   - please hold NSAIDs  - please send Hpylori serology, if positive then will need to be treated with triple therapy   - recommend EGD as an outpatient (in 2-3 months, after the COVID pandemic settles)  - supportive care as per primary team  - patient can follow up with GI as an outpatient on discharge ([facility]: [phone], Attending Office: [phone])    Please call us back as needed    Shaye Valencia, PGY-6  GI fellow  B- [phone]/ [phone]  Please call GI fellow on call after 5pm and on weekends

## 2020-03-30 NOTE — PROGRESS NOTE ADULT - ATTENDING COMMENTS
Advanced care planning was discussed with patient and family.  Advanced care planning forms were reviewed and discussed.

## 2020-03-30 NOTE — PROGRESS NOTE ADULT - PROBLEM SELECTOR PLAN 4
--c/w home losartan. Hold HCTZ pending bleeding resolution/repeat CBC. Restart as tolerated.
--c/w home losartan. Hold HCTZ pending bleeding resolution/repeat CBC. Restart as tolerated.

## 2020-03-30 NOTE — PROGRESS NOTE ADULT - PROBLEM SELECTOR PLAN 2
--suspect UGIB given recent, new NSAID use  --GI consult emailed to assess whether EGD should be performed inpatient vs pt stabilized and defer to outpatient given COVID19 outbreak  --trend CBC following transfusion  --c/w PPI IV BID  --CT abd/Pelv noted   - liquid diet   -- Check for Hpylori
--suspect UGIB given recent, new NSAID use  --GI consult emailed to assess whether EGD should be performed inpatient vs pt stabilized and defer to outpatient given COVID19 outbreak  --trend CBC following transfusion  --c/w PPI IV BID  --CT abd/Pelv chest IV and PO contrast  - NPO for now, can advance if tolerated

## 2020-03-30 NOTE — PROGRESS NOTE ADULT - PROBLEM SELECTOR PLAN 1
--Hgb 7.8 from baseline 13s   --PRBC transfusion   --not on blood thinners  --likely 2/2 UGIB  - GI eval appreciated
--Hgb 7.8 from baseline 13s   --ED transfusing 2 units pRBC  --not on blood thinners  --likely 2/2 UGIB  - GI eval called

## 2020-03-31 ENCOUNTER — TRANSCRIPTION ENCOUNTER (OUTPATIENT)
Age: 70
End: 2020-03-31

## 2020-03-31 VITALS
RESPIRATION RATE: 18 BRPM | OXYGEN SATURATION: 95 % | HEART RATE: 58 BPM | SYSTOLIC BLOOD PRESSURE: 124 MMHG | TEMPERATURE: 98 F | DIASTOLIC BLOOD PRESSURE: 72 MMHG

## 2020-03-31 LAB
BASOPHILS # BLD AUTO: 0.05 K/UL — SIGNIFICANT CHANGE UP (ref 0–0.2)
BASOPHILS NFR BLD AUTO: 0.8 % — SIGNIFICANT CHANGE UP (ref 0–2)
EOSINOPHIL # BLD AUTO: 0.37 K/UL — SIGNIFICANT CHANGE UP (ref 0–0.5)
EOSINOPHIL NFR BLD AUTO: 6.2 % — HIGH (ref 0–6)
HCT VFR BLD CALC: 34.3 % — LOW (ref 34.5–45)
HGB BLD-MCNC: 10.8 G/DL — LOW (ref 11.5–15.5)
IMM GRANULOCYTES NFR BLD AUTO: 0.2 % — SIGNIFICANT CHANGE UP (ref 0–1.5)
LYMPHOCYTES # BLD AUTO: 2.26 K/UL — SIGNIFICANT CHANGE UP (ref 1–3.3)
LYMPHOCYTES # BLD AUTO: 37.8 % — SIGNIFICANT CHANGE UP (ref 13–44)
MCHC RBC-ENTMCNC: 29.6 PG — SIGNIFICANT CHANGE UP (ref 27–34)
MCHC RBC-ENTMCNC: 31.5 GM/DL — LOW (ref 32–36)
MCV RBC AUTO: 94 FL — SIGNIFICANT CHANGE UP (ref 80–100)
MONOCYTES # BLD AUTO: 0.43 K/UL — SIGNIFICANT CHANGE UP (ref 0–0.9)
MONOCYTES NFR BLD AUTO: 7.2 % — SIGNIFICANT CHANGE UP (ref 2–14)
NEUTROPHILS # BLD AUTO: 2.86 K/UL — SIGNIFICANT CHANGE UP (ref 1.8–7.4)
NEUTROPHILS NFR BLD AUTO: 47.8 % — SIGNIFICANT CHANGE UP (ref 43–77)
NRBC # BLD: 0 /100 WBCS — SIGNIFICANT CHANGE UP (ref 0–0)
PLATELET # BLD AUTO: 283 K/UL — SIGNIFICANT CHANGE UP (ref 150–400)
RBC # BLD: 3.65 M/UL — LOW (ref 3.8–5.2)
RBC # FLD: 14 % — SIGNIFICANT CHANGE UP (ref 10.3–14.5)
WBC # BLD: 5.98 K/UL — SIGNIFICANT CHANGE UP (ref 3.8–10.5)
WBC # FLD AUTO: 5.98 K/UL — SIGNIFICANT CHANGE UP (ref 3.8–10.5)

## 2020-03-31 PROCEDURE — 85730 THROMBOPLASTIN TIME PARTIAL: CPT

## 2020-03-31 PROCEDURE — 82550 ASSAY OF CK (CPK): CPT

## 2020-03-31 PROCEDURE — 80048 BASIC METABOLIC PNL TOTAL CA: CPT

## 2020-03-31 PROCEDURE — 86850 RBC ANTIBODY SCREEN: CPT

## 2020-03-31 PROCEDURE — 82787 IGG 1 2 3 OR 4 EACH: CPT

## 2020-03-31 PROCEDURE — 80053 COMPREHEN METABOLIC PANEL: CPT

## 2020-03-31 PROCEDURE — 82553 CREATINE MB FRACTION: CPT

## 2020-03-31 PROCEDURE — 36430 TRANSFUSION BLD/BLD COMPNT: CPT

## 2020-03-31 PROCEDURE — 74177 CT ABD & PELVIS W/CONTRAST: CPT

## 2020-03-31 PROCEDURE — P9016: CPT

## 2020-03-31 PROCEDURE — 99285 EMERGENCY DEPT VISIT HI MDM: CPT | Mod: 25

## 2020-03-31 PROCEDURE — 85027 COMPLETE CBC AUTOMATED: CPT

## 2020-03-31 PROCEDURE — 86677 HELICOBACTER PYLORI ANTIBODY: CPT

## 2020-03-31 PROCEDURE — 96374 THER/PROPH/DIAG INJ IV PUSH: CPT

## 2020-03-31 PROCEDURE — 86900 BLOOD TYPING SEROLOGIC ABO: CPT

## 2020-03-31 PROCEDURE — 85610 PROTHROMBIN TIME: CPT

## 2020-03-31 PROCEDURE — 93005 ELECTROCARDIOGRAM TRACING: CPT

## 2020-03-31 PROCEDURE — 86803 HEPATITIS C AB TEST: CPT

## 2020-03-31 PROCEDURE — 86901 BLOOD TYPING SEROLOGIC RH(D): CPT

## 2020-03-31 PROCEDURE — 86923 COMPATIBILITY TEST ELECTRIC: CPT

## 2020-03-31 PROCEDURE — 84484 ASSAY OF TROPONIN QUANT: CPT

## 2020-03-31 RX ORDER — ACETAMINOPHEN 500 MG
2 TABLET ORAL
Qty: 0 | Refills: 0 | DISCHARGE
Start: 2020-03-31

## 2020-03-31 RX ORDER — PANTOPRAZOLE SODIUM 20 MG/1
1 TABLET, DELAYED RELEASE ORAL
Qty: 60 | Refills: 0
Start: 2020-03-31 | End: 2020-04-29

## 2020-03-31 RX ORDER — PANTOPRAZOLE SODIUM 20 MG/1
40 TABLET, DELAYED RELEASE ORAL EVERY 12 HOURS
Refills: 0 | Status: DISCONTINUED | OUTPATIENT
Start: 2020-03-31 | End: 2020-03-31

## 2020-03-31 RX ORDER — PANTOPRAZOLE SODIUM 20 MG/1
40 TABLET, DELAYED RELEASE ORAL ONCE
Refills: 0 | Status: COMPLETED | OUTPATIENT
Start: 2020-03-31 | End: 2020-03-31

## 2020-03-31 RX ADMIN — Medication 1 TABLET(S): at 12:47

## 2020-03-31 RX ADMIN — SODIUM CHLORIDE 75 MILLILITER(S): 9 INJECTION, SOLUTION INTRAVENOUS at 05:20

## 2020-03-31 RX ADMIN — PANTOPRAZOLE SODIUM 40 MILLIGRAM(S): 20 TABLET, DELAYED RELEASE ORAL at 12:47

## 2020-03-31 RX ADMIN — LOSARTAN POTASSIUM 50 MILLIGRAM(S): 100 TABLET, FILM COATED ORAL at 05:19

## 2020-03-31 RX ADMIN — PANTOPRAZOLE SODIUM 8 MG/HR: 20 TABLET, DELAYED RELEASE ORAL at 05:20

## 2020-03-31 RX ADMIN — Medication 650 MILLIGRAM(S): at 00:07

## 2020-03-31 NOTE — DIETITIAN INITIAL EVALUATION ADULT. - OTHER INFO
Diet PTA: Pt reports decreased appetite/intake for ~ 5 days PTA, stated during that time she was trying to consume lighter foods such as soups. Previously pt reports eating well with good appetite consuming 3 meals per day consisting of lighter foods such as smoothies, salads, yogurt in efforts to reduce weight. Pt reports she does not use salt in her cooking and will try to avoid concentrated sweets.     Weight: Pt reports active efforts to reduce weight, stated her weight 3 months ago was 144 lbs and she had lost ~4 lbs to her recent doctor's office weight of 140 lbs, consistent with current dosing weight 139.7 lbs (3/28).     Pt with no food allergies but does report lactose intolerance, stated she can tolerate a small amount of dairy however and does not wish for this to be restricted in house. Pt took calcium with vitamin D at home. Pt with no N+V, last BM this morning-loose. No difficulty chewing/swallowing.     Diet education: RD reviewed general healthy eating nutrition therapy as well as avoidance of concentrated sweets given reports of borderline DM.

## 2020-03-31 NOTE — DIETITIAN INITIAL EVALUATION ADULT. - REASON INDICATOR FOR ASSESSMENT
Pt seen for inadequate diet in house for >3 days. Pt with PMH including HTN, hyperlipidemia, ? pre-DM-per pt her MD mentioned her blood glucose has been elevated and was prescribed Metformin-not taking now. pt admitted with symptomatic anemia and GI bleed.

## 2020-03-31 NOTE — DISCHARGE NOTE PROVIDER - CARE PROVIDER_API CALL
PMD,   Phone: (   )    -  Fax: (   )    -  Follow Up Time: 1 week    [facility] GI fellow clinic,   Phone: [phone]  Fax: (   )    -  Follow Up Time: 1 month

## 2020-03-31 NOTE — DISCHARGE NOTE NURSING/CASE MANAGEMENT/SOCIAL WORK - PATIENT PORTAL LINK FT
You can access the FollowMyHealth Patient Portal offered by [facility] by registering at the following website: [email or web address]. By joining [facility]’s FollowMyHealth portal, you will also be able to view your health information using other applications (apps) compatible with our system.

## 2020-03-31 NOTE — DIETITIAN INITIAL EVALUATION ADULT. - PHYSICAL APPEARANCE
well nourished/No overt signs of muscle mass or body fat depletion/other (specify) Ht: 5'2", Wt: 139.7 lbs, BMI: 25.6 kg/m2, IBW: 110 lbs +/- 10%, %IBW: 127%  No edema, Skin free of pressure injury per nursing flowsheets

## 2020-03-31 NOTE — DISCHARGE NOTE PROVIDER - NSDCCPCAREPLAN_GEN_ALL_CORE_FT
PRINCIPAL DISCHARGE DIAGNOSIS  Diagnosis: GI bleeding  Assessment and Plan of Treatment: monitor and report to provider with signs & symptoms of GI bleeding or worsening anemia  follow up H pyloric result with PMD or GI dr and treat as needed

## 2020-03-31 NOTE — DIETITIAN INITIAL EVALUATION ADULT. - ENERGY INTAKE
Pt reports tolerating clear liquids well, mostly taking juices and broth, pt tolerated high protein gelatin this morning, looking forward to  more solid food/Currently NPO < 5 days

## 2020-03-31 NOTE — DISCHARGE NOTE PROVIDER - HOSPITAL COURSE
69 year old female with HTN, HLD, prediabetes presented to the emergency room with chief complain of melena.     Pt was found to have Acute blood loss anemia with melena, Hb 7.8-->9.6 now s/p 2u pRBC    Likely 2/2 NSAID gastropathy, PUD, vs esophagitis, erosive gastritis, angioectasia        Pt had intermittent Epigastric and chest pain, likely 2/2 trauma: EKG and cardiac troponins nrl, CT abdomen with no cause of abdominal pain identified         Pt to be discharged home today on PPI PO BID     She is to advance as tolerated; hold NSAIDs; fu Hpylori serology, if positive then will need to be treated with triple therapy as out patient.     Pt to have EGD as an outpatient (in 2-3 months, after the COVID pandemic settles)    Patient can follow up with GI as an outpatient on discharge ([facility]: [phone], Attending Office: [phone]) 69 year old female with HTN, HLD, prediabetes presented to the emergency room with chief complain of melena.     Pt was found to have Acute blood loss anemia with melena, Hb 7.8-->9.6 now s/p 2u pRBC    Likely 2/2 NSAID gastropathy, PUD, vs esophagitis, erosive gastritis, angioectasia        Pt had intermittent Epigastric and chest pain, likely 2/2 trauma: EKG and cardiac troponins nrl, CT abdomen with no cause of abdominal pain identified         Pt to be discharged home today on PPI PO BID     She is to advance as tolerated; hold NSAIDs; fu Hpylori serology, if positive then will need to be treated with triple therapy as out patient.     Pt to have EGD as an outpatient (in 2-3 months, after the COVID pandemic settles)    Patient can follow up with GI as an outpatient on discharge ([facility]: [phone], Attending Office: [phone])            hgb stable    feeeling much better    F.U with GI for pending labs     return if recurrent bleeding     pt repeated underestanding

## 2020-03-31 NOTE — DISCHARGE NOTE PROVIDER - PROVIDER TOKENS
FREE:[LAST:[PMD],PHONE:[(   )    -],FAX:[(   )    -],FOLLOWUP:[1 week]],FREE:[LAST:[[facility] GI fellow clinic],PHONE:[phone]],FAX:[(   )    -],FOLLOWUP:[1 month]]

## 2020-03-31 NOTE — DIETITIAN INITIAL EVALUATION ADULT. - ADD RECOMMEND
1. Advance diet as medically feasible, while pt remains on clear consider adding ensure clear 2 daily, 2. Recommend obtaining current HgbA1C, 3. Monitor diet advance/tolerance, wt trends, labs, skin

## 2020-04-01 LAB
H PYLORI AB SER-ACNC: 78.6 UNITS — HIGH
H PYLORI IGA SER-ACNC: 73.5 UNITS — HIGH
IGG SERPL-MCNC: 755 MG/DL — SIGNIFICANT CHANGE UP (ref 586–1602)
IGG1 SER-MCNC: 363 MG/DL — SIGNIFICANT CHANGE UP (ref 248–810)
IGG2 SER-MCNC: 256 MG/DL — SIGNIFICANT CHANGE UP (ref 130–555)
IGG3 SER-MCNC: 49 MG/DL — SIGNIFICANT CHANGE UP (ref 15–102)
IGG4 SER-MCNC: 26 MG/DL — SIGNIFICANT CHANGE UP (ref 2–96)

## 2022-11-06 ENCOUNTER — OFFICE (OUTPATIENT)
Dept: URBAN - METROPOLITAN AREA CLINIC 90 | Facility: CLINIC | Age: 72
Setting detail: OPHTHALMOLOGY
End: 2022-11-06
Payer: COMMERCIAL

## 2022-11-06 DIAGNOSIS — H25.13: ICD-10-CM

## 2022-11-06 PROCEDURE — 92004 COMPRE OPH EXAM NEW PT 1/>: CPT | Performed by: OPHTHALMOLOGY

## 2022-11-06 ASSESSMENT — VISUAL ACUITY
OD_BCVA: 20/40-2
OS_BCVA: 20/25-2

## 2022-11-06 ASSESSMENT — REFRACTION_CURRENTRX
OS_CYLINDER: -0.25
OS_SPHERE: +2.25
OD_ADD: +2.25
OD_SPHERE: +2.25
OD_AXIS: 085
OS_ADD: +2.25
OS_OVR_VA: 20/
OS_AXIS: 100
OD_OVR_VA: 20/
OD_CYLINDER: -0.50

## 2022-11-06 ASSESSMENT — SPHEQUIV_DERIVED
OS_SPHEQUIV: 1.875
OD_SPHEQUIV: 2.125

## 2022-11-06 ASSESSMENT — REFRACTION_AUTOREFRACTION
OS_AXIS: 129
OD_CYLINDER: -0.25
OS_SPHERE: +2.25
OD_AXIS: 155
OS_CYLINDER: -0.75
OD_SPHERE: +2.25

## 2022-11-06 ASSESSMENT — KERATOMETRY
OD_AXISANGLE_DEGREES: 139
OS_K1POWER_DIOPTERS: UTP
OD_K1POWER_DIOPTERS: 44.25
OD_K2POWER_DIOPTERS: 44.50

## 2022-11-06 ASSESSMENT — CONFRONTATIONAL VISUAL FIELD TEST (CVF)
OS_FINDINGS: FULL
OD_FINDINGS: FULL

## 2022-11-06 ASSESSMENT — TONOMETRY
OS_IOP_MMHG: 12
OD_IOP_MMHG: 12

## 2022-11-06 ASSESSMENT — AXIALLENGTH_DERIVED: OD_AL: 22.496

## 2023-05-15 ENCOUNTER — OFFICE (OUTPATIENT)
Dept: URBAN - METROPOLITAN AREA CLINIC 90 | Facility: CLINIC | Age: 73
Setting detail: OPHTHALMOLOGY
End: 2023-05-15
Payer: COMMERCIAL

## 2023-05-15 DIAGNOSIS — H25.13: ICD-10-CM

## 2023-05-15 PROBLEM — H10.45 ALLERGIC CONJUNCTIVITIS: Status: ACTIVE | Noted: 2023-05-15

## 2023-05-15 PROCEDURE — 92012 INTRM OPH EXAM EST PATIENT: CPT | Performed by: OPHTHALMOLOGY

## 2023-05-15 ASSESSMENT — REFRACTION_CURRENTRX
OD_CYLINDER: -0.50
OD_AXIS: 082
OS_OVR_VA: 20/
OD_AXIS: 085
OS_ADD: +2.25
OS_ADD: +2.25
OD_OVR_VA: 20/
OD_SPHERE: +2.25
OS_CYLINDER: -0.25
OS_AXIS: 088
OD_OVR_VA: 20/
OD_ADD: +2.25
OS_OVR_VA: 20/
OS_CYLINDER: -0.25
OD_ADD: +2.25
OS_SPHERE: +2.50
OD_CYLINDER: -0.50
OD_SPHERE: +2.25
OS_SPHERE: +2.25
OS_AXIS: 100

## 2023-05-15 ASSESSMENT — REFRACTION_MANIFEST
OD_VA1: 20/25+
OS_VA1: 20/50+
OS_VA1: 20/50+
OS_SPHERE: +1.50
OS_ADD: +3.00
OS_VA2: 20/30(J2)
OD_AXIS: 145
OS_CYLINDER: SPH
OS_CYLINDER: SPH
OD_CYLINDER: -0.25
OD_VA2: 20/30(J2)
OS_SPHERE: +1.25
OD_ADD: +2.50
OD_VA2: 20/30(J2)
OD_ADD: +3.00
OD_SPHERE: +1.50
OD_CYLINDER: -0.25
OD_AXIS: 145
OD_VA1: 20/25+
OD_SPHERE: +1.50
OS_VA2: 20/30(J2)
OS_ADD: +2.50

## 2023-05-15 ASSESSMENT — CONFRONTATIONAL VISUAL FIELD TEST (CVF)
OS_FINDINGS: FULL
OD_FINDINGS: FULL

## 2023-05-15 ASSESSMENT — AXIALLENGTH_DERIVED
OD_AL: 22.81
OD_AL: 22.81
OS_AL: 0.2
OD_AL: 22.63

## 2023-05-15 ASSESSMENT — KERATOMETRY
OS_AXISANGLE_DEGREES: 055
OD_AXISANGLE_DEGREES: 139
OS_K2POWER_DIOPTERS: 44.25
OD_K2POWER_DIOPTERS: 44.51
OS_K1POWER_DIOPTERS: 15044
OD_K1POWER_DIOPTERS: 44.00

## 2023-05-15 ASSESSMENT — SPHEQUIV_DERIVED
OD_SPHEQUIV: 1.375
OS_SPHEQUIV: 1.75
OD_SPHEQUIV: 1.875
OD_SPHEQUIV: 1.375

## 2023-05-15 ASSESSMENT — REFRACTION_AUTOREFRACTION
OS_AXIS: 113
OS_CYLINDER: -0.50
OD_CYLINDER: -0.25
OD_SPHERE: +2.00
OD_AXIS: 150
OS_SPHERE: +2.00

## 2023-05-15 ASSESSMENT — TONOMETRY
OD_IOP_MMHG: 14
OS_IOP_MMHG: 14

## 2023-05-15 ASSESSMENT — VISUAL ACUITY
OD_BCVA: 20/40-2
OS_BCVA: 20/25-2

## 2023-05-15 ASSESSMENT — LID POSITION - COMMENTS
OS_COMMENTS: TEMPORAL HOODING
OD_COMMENTS: TEMPORAL HOODING

## 2023-11-15 ENCOUNTER — OFFICE (OUTPATIENT)
Dept: URBAN - METROPOLITAN AREA CLINIC 90 | Facility: CLINIC | Age: 73
Setting detail: OPHTHALMOLOGY
End: 2023-11-15
Payer: COMMERCIAL

## 2023-11-15 DIAGNOSIS — H25.13: ICD-10-CM

## 2023-11-15 DIAGNOSIS — H35.033: ICD-10-CM

## 2023-11-15 PROBLEM — E11.9 DIABETES TYPE 2 NO RETINOPATHY ; BOTH EYES: Status: ACTIVE | Noted: 2023-11-15

## 2023-11-15 PROCEDURE — 92250 FUNDUS PHOTOGRAPHY W/I&R: CPT | Performed by: OPHTHALMOLOGY

## 2023-11-15 PROCEDURE — 92014 COMPRE OPH EXAM EST PT 1/>: CPT | Performed by: OPHTHALMOLOGY

## 2023-11-15 ASSESSMENT — SPHEQUIV_DERIVED
OD_SPHEQUIV: 1.75
OS_SPHEQUIV: 0.875
OD_SPHEQUIV: 1.375
OD_SPHEQUIV: 1.375

## 2023-11-15 ASSESSMENT — REFRACTION_CURRENTRX
OD_CYLINDER: -0.50
OS_AXIS: 180
OS_OVR_VA: 20/
OS_ADD: +2.75
OD_CYLINDER: -0.50
OS_OVR_VA: 20/
OD_OVR_VA: 20/
OD_CYLINDER: -0.25
OD_SPHERE: +2.25
OD_SPHERE: +1.50
OS_CYLINDER: 0.00
OS_CYLINDER: -0.25
OS_ADD: +2.25
OD_OVR_VA: 20/
OS_ADD: +2.25
OS_CYLINDER: -0.25
OS_AXIS: 100
OD_ADD: +2.25
OS_SPHERE: +2.25
OD_OVR_VA: 20/
OD_AXIS: 082
OD_ADD: +2.25
OD_AXIS: 138
OS_AXIS: 088
OS_SPHERE: +1.50
OS_SPHERE: +2.50
OD_AXIS: 085
OS_OVR_VA: 20/
OD_SPHERE: +2.25
OD_ADD: +2.25

## 2023-11-15 ASSESSMENT — REFRACTION_MANIFEST
OS_ADD: +3.00
OD_VA1: 20/25+
OS_SPHERE: +1.50
OS_VA1: 20/50+
OD_AXIS: 145
OD_SPHERE: +1.50
OS_SPHERE: +1.25
OS_ADD: +2.50
OS_CYLINDER: SPH
OS_VA2: 20/30(J2)
OD_AXIS: 145
OD_ADD: +2.50
OS_CYLINDER: SPH
OD_VA2: 20/30(J2)
OD_CYLINDER: -0.25
OD_CYLINDER: -0.25
OD_SPHERE: +1.50
OS_VA1: 20/50+
OD_ADD: +3.00
OD_VA1: 20/25+
OD_VA2: 20/30(J2)
OS_VA2: 20/30(J2)

## 2023-11-15 ASSESSMENT — REFRACTION_AUTOREFRACTION
OS_AXIS: 142
OD_SPHERE: +2.00
OS_SPHERE: +1.00
OS_CYLINDER: -0.25
OD_AXIS: 115
OD_CYLINDER: -0.50

## 2023-11-15 ASSESSMENT — CONFRONTATIONAL VISUAL FIELD TEST (CVF)
OS_FINDINGS: FULL
OD_FINDINGS: FULL

## 2023-11-15 ASSESSMENT — LID POSITION - COMMENTS
OD_COMMENTS: TEMPORAL HOODING
OS_COMMENTS: TEMPORAL HOODING

## 2024-05-15 ENCOUNTER — RX ONLY (RX ONLY)
Age: 74
End: 2024-05-15

## 2024-05-15 ENCOUNTER — OFFICE (OUTPATIENT)
Dept: URBAN - METROPOLITAN AREA CLINIC 90 | Facility: CLINIC | Age: 74
Setting detail: OPHTHALMOLOGY
End: 2024-05-15
Payer: COMMERCIAL

## 2024-05-15 DIAGNOSIS — H25.13: ICD-10-CM

## 2024-05-15 DIAGNOSIS — E11.9: ICD-10-CM

## 2024-05-15 DIAGNOSIS — H35.033: ICD-10-CM

## 2024-05-15 DIAGNOSIS — H02.831: ICD-10-CM

## 2024-05-15 PROCEDURE — 92014 COMPRE OPH EXAM EST PT 1/>: CPT | Performed by: OPHTHALMOLOGY

## 2024-05-15 ASSESSMENT — LID POSITION - DERMATOCHALASIS: OD_DERMATOCHALASIS: RUL

## 2024-05-15 ASSESSMENT — CONFRONTATIONAL VISUAL FIELD TEST (CVF)
OD_FINDINGS: FULL
OS_FINDINGS: FULL

## 2024-05-15 ASSESSMENT — LID POSITION - COMMENTS
OS_COMMENTS: TEMPORAL HOODING
OD_COMMENTS: TEMPORAL HOODING

## 2024-11-20 ENCOUNTER — OFFICE (OUTPATIENT)
Age: 74
Setting detail: OPHTHALMOLOGY
End: 2024-11-20
Payer: COMMERCIAL

## 2024-11-20 DIAGNOSIS — H25.13: ICD-10-CM

## 2024-11-20 DIAGNOSIS — H52.7: ICD-10-CM

## 2024-11-20 DIAGNOSIS — H02.831: ICD-10-CM

## 2024-11-20 DIAGNOSIS — H35.033: ICD-10-CM

## 2024-11-20 DIAGNOSIS — E11.9: ICD-10-CM

## 2024-11-20 PROCEDURE — 92014 COMPRE OPH EXAM EST PT 1/>: CPT | Performed by: OPHTHALMOLOGY

## 2024-11-20 ASSESSMENT — REFRACTION_MANIFEST
OD_CYLINDER: -0.25
OS_CYLINDER: SPH
OD_SPHERE: +1.75
OD_VA2: 20/20(J1+)
OD_CYLINDER: -0.50
OD_ADD: +2.50
OS_SPHERE: +1.25
OD_VA2: 20/30(J2)
OD_AXIS: 145
OD_SPHERE: +1.50
OS_CYLINDER: SPH
OS_ADD: +2.50
OD_ADD: +2.50
OD_SPHERE: +1.50
OD_VA1: 20/25+
OS_AXIS: 105
OD_CYLINDER: -0.25
OD_VA1: 20/25+
OS_CYLINDER: -0.25
OS_VA2: 20/20(J1+)
OS_VA2: 20/30(J2)
OD_AXIS: 145
OS_VA2: 20/30(J2)
OD_ADD: +3.00
OD_VA1: 20/20-1
OS_ADD: +2.50
OS_ADD: +3.00
OS_SPHERE: +1.00
OS_SPHERE: +1.50
OS_VA1: 20/40 SLOW
OD_VA2: 20/30(J2)
OD_AXIS: 140
OS_VA1: 20/50+
OS_VA1: 20/50+

## 2024-11-20 ASSESSMENT — REFRACTION_AUTOREFRACTION
OS_AXIS: 110
OS_SPHERE: +1.25
OD_AXIS: 145
OD_SPHERE: +2.00
OS_CYLINDER: -1.00
OD_CYLINDER: -0.50

## 2024-11-20 ASSESSMENT — REFRACTION_CURRENTRX
OD_SPHERE: +2.25
OS_AXIS: 100
OS_AXIS: 088
OD_OVR_VA: 20/
OS_SPHERE: +2.50
OS_SPHERE: +1.50
OS_ADD: +2.25
OD_OVR_VA: 20/
OD_ADD: +2.25
OD_AXIS: 082
OD_ADD: +2.25
OD_CYLINDER: -0.50
OS_ADD: +3.00
OS_ADD: +2.25
OD_ADD: +3.00
OD_SPHERE: +1.50
OS_CYLINDER: -0.25
OD_OVR_VA: 20/
OS_OVR_VA: 20/
OD_CYLINDER: -0.50
OS_CYLINDER: -0.25
OD_AXIS: 138
OD_VPRISM_DIRECTION: PROGS
OS_OVR_VA: 20/
OD_CYLINDER: -0.25
OS_VPRISM_DIRECTION: PROGS
OS_OVR_VA: 20/
OS_SPHERE: +2.25
OD_AXIS: 085
OS_CYLINDER: 0.00
OS_AXIS: 180
OD_SPHERE: +2.25

## 2024-11-20 ASSESSMENT — TONOMETRY
OS_IOP_MMHG: 14
OD_IOP_MMHG: 15

## 2024-11-20 ASSESSMENT — KERATOMETRY
OS_K2POWER_DIOPTERS: 44.50
OD_AXISANGLE_DEGREES: 138
OS_AXISANGLE_DEGREES: 047
OD_K2POWER_DIOPTERS: 44.50
OS_K1POWER_DIOPTERS: 44.00
OD_K1POWER_DIOPTERS: 44.00

## 2024-11-20 ASSESSMENT — LID POSITION - COMMENTS
OD_COMMENTS: TEMPORAL HOODING
OS_COMMENTS: TEMPORAL HOODING

## 2024-11-20 ASSESSMENT — CONFRONTATIONAL VISUAL FIELD TEST (CVF)
OS_FINDINGS: FULL
OD_FINDINGS: FULL

## 2024-11-20 ASSESSMENT — VISUAL ACUITY
OS_BCVA: 20/30+2
OD_BCVA: 20/50

## 2024-11-20 ASSESSMENT — LID POSITION - DERMATOCHALASIS: OD_DERMATOCHALASIS: RUL

## 2025-04-30 NOTE — DIETITIAN INITIAL EVALUATION ADULT. - PROBLEM SELECTOR PLAN 4
c/w home losartan. Hold HCTZ pending bleeding resolution/repeat CBC. Restart as tolerated.

## 2025-05-12 ENCOUNTER — OFFICE (OUTPATIENT)
Facility: LOCATION | Age: 75
Setting detail: OPHTHALMOLOGY
End: 2025-05-12
Payer: COMMERCIAL

## 2025-05-12 DIAGNOSIS — E11.9: ICD-10-CM

## 2025-05-12 DIAGNOSIS — H25.13: ICD-10-CM

## 2025-05-12 DIAGNOSIS — H35.033: ICD-10-CM

## 2025-05-12 DIAGNOSIS — H02.831: ICD-10-CM

## 2025-05-12 PROCEDURE — 92014 COMPRE OPH EXAM EST PT 1/>: CPT | Performed by: STUDENT IN AN ORGANIZED HEALTH CARE EDUCATION/TRAINING PROGRAM

## 2025-05-12 ASSESSMENT — CONFRONTATIONAL VISUAL FIELD TEST (CVF)
OD_FINDINGS: FULL
OS_FINDINGS: FULL

## 2025-05-14 ASSESSMENT — REFRACTION_CURRENTRX
OS_AXIS: 100
OS_VPRISM_DIRECTION: PROGS
OD_SPHERE: +1.50
OD_AXIS: 085
OS_ADD: +2.25
OD_OVR_VA: 20/
OD_ADD: +2.25
OS_CYLINDER: -0.25
OD_CYLINDER: -0.21
OD_CYLINDER: -0.50
OD_OVR_VA: 20/
OD_CYLINDER: -0.25
OS_OVR_VA: 20/
OD_SPHERE: +2.25
OD_AXIS: 082
OS_SPHERE: +1.50
OS_CYLINDER: -SPH
OS_SPHERE: +2.25
OS_AXIS: 180
OD_OVR_VA: 20/
OS_VPRISM_DIRECTION: PROGS
OD_AXIS: 138
OD_SPHERE: +2.25
OS_CYLINDER: 0.00
OS_ADD: +2.75
OS_OVR_VA: 20/
OD_ADD: +2.75
OD_CYLINDER: -0.50
OS_SPHERE: +1.50
OD_VPRISM_DIRECTION: PROGS
OS_SPHERE: +2.50
OD_ADD: +3.00
OD_VPRISM_DIRECTION: PROGS
OS_OVR_VA: 20/
OS_CYLINDER: -0.25
OS_ADD: +2.25
OD_ADD: +2.25
OS_ADD: +3.00
OD_SPHERE: +1.50
OS_AXIS: 088

## 2025-05-14 ASSESSMENT — REFRACTION_MANIFEST
OS_CYLINDER: -0.25
OD_AXIS: 145
OS_VA2: 20/30(J2)
OS_VA2: 20/20(J1+)
OS_ADD: +2.50
OS_SPHERE: +1.50
OS_VA2: 20/30(J2)
OD_AXIS: 145
OD_CYLINDER: -0.50
OS_ADD: +2.50
OS_AXIS: 105
OD_ADD: +2.50
OD_SPHERE: +1.50
OD_ADD: +3.00
OD_VA1: 20/25+
OD_VA2: 20/30(J2)
OD_CYLINDER: -0.25
OD_CYLINDER: -0.25
OD_SPHERE: +1.50
OS_CYLINDER: SPH
OS_VA1: 20/50+
OD_VA1: 20/20-1
OS_VA1: 20/40 SLOW
OS_ADD: +3.00
OD_AXIS: 140
OS_SPHERE: +1.00
OD_ADD: +2.50
OD_VA2: 20/30(J2)
OS_SPHERE: +1.25
OS_CYLINDER: SPH
OD_VA2: 20/20(J1+)
OS_VA1: 20/50+
OD_VA1: 20/25+
OD_SPHERE: +1.75

## 2025-05-14 ASSESSMENT — KERATOMETRY
OS_AXISANGLE_DEGREES: 038
OD_AXISANGLE_DEGREES: 147
OS_K1POWER_DIOPTERS: 44.00
OS_K2POWER_DIOPTERS: 44.50
OD_K2POWER_DIOPTERS: 44.75
OD_K1POWER_DIOPTERS: 44.25

## 2025-05-14 ASSESSMENT — REFRACTION_AUTOREFRACTION
OS_AXIS: 122
OS_CYLINDER: -1.25
OD_AXIS: 138
OD_SPHERE: +2.00
OD_CYLINDER: -0.50
OS_SPHERE: +1.25

## 2025-05-14 ASSESSMENT — LID POSITION - COMMENTS
OS_COMMENTS: TEMPORAL HOODING
OD_COMMENTS: TEMPORAL HOODING

## 2025-05-14 ASSESSMENT — LID POSITION - DERMATOCHALASIS: OD_DERMATOCHALASIS: RUL

## 2025-05-14 ASSESSMENT — VISUAL ACUITY: OS_BCVA: 20/25+2

## 2025-07-01 NOTE — DISCHARGE NOTE PROVIDER - NSDCMRMEDTOKEN_GEN_ALL_CORE_FT
Annual Physical   Labs 06/26/25      Recent PHQ 2/9 Score    PHQ 2:  PHQ 2 Score Adult PHQ 2 Score Adult PHQ 2 Interpretation Little interest or pleasure in activity?   7/1/2025   3:43 PM 0 No further screening needed 0       PHQ 9:     PHQ-2/9 Depression Screening  Little interest or pleasure in activity?: Not at all  Feeling down, depressed or hopeless?: Not at all  Initial depression screening score:: 0  PHQ2 Interpretation: No further screening needed          Health Maintenance       COVID-19 Vaccine (4 - 2024-25 season)  Overdue since 9/1/2024    Depression Screening (Yearly)  Never done    Hepatitis B Vaccine (1 of 3 - 19+ 3-dose series)  Never done    Pneumococcal Vaccine 50+ (1 of 1 - PCV)  Never done           Following review of the above:  Patient is not proceeding with: COVID-19, Hep B, and Pneumococcal    Note: Refer to final orders and clinician documentation.          
acetaminophen 325 mg oral tablet: 2 tab(s) orally every 6 hours, As needed, Mild Pain (1 - 3), Moderate Pain (4 - 6)  Calcium 600+D 600 mg-200 intl units (5 mcg) oral tablet: 1 tab(s) orally 2 times a day  hydroCHLOROthiazide 12.5 mg oral tablet: 1 tab(s) orally once a day, resume per PMD   Lipitor 20 mg oral tablet: 1 tab(s) orally once a day  losartan 50 mg oral tablet: 1 tab(s) orally once a day  pantoprazole 40 mg oral delayed release tablet: 1 tab(s) orally every 12 hours

## 2025-08-25 ENCOUNTER — EMERGENCY (EMERGENCY)
Facility: HOSPITAL | Age: 75
LOS: 1 days | End: 2025-08-25
Attending: STUDENT IN AN ORGANIZED HEALTH CARE EDUCATION/TRAINING PROGRAM
Payer: COMMERCIAL

## 2025-08-25 VITALS
HEIGHT: 62 IN | RESPIRATION RATE: 20 BRPM | WEIGHT: 139.99 LBS | DIASTOLIC BLOOD PRESSURE: 81 MMHG | SYSTOLIC BLOOD PRESSURE: 168 MMHG | HEART RATE: 99 BPM | OXYGEN SATURATION: 98 % | TEMPERATURE: 98 F

## 2025-08-25 PROCEDURE — 99283 EMERGENCY DEPT VISIT LOW MDM: CPT

## 2025-08-26 VITALS
SYSTOLIC BLOOD PRESSURE: 146 MMHG | DIASTOLIC BLOOD PRESSURE: 82 MMHG | TEMPERATURE: 99 F | RESPIRATION RATE: 20 BRPM | OXYGEN SATURATION: 98 % | HEART RATE: 86 BPM

## 2025-08-26 PROBLEM — I10 ESSENTIAL (PRIMARY) HYPERTENSION: Chronic | Status: ACTIVE | Noted: 2020-03-29

## 2025-08-26 LAB
APPEARANCE UR: CLEAR — SIGNIFICANT CHANGE UP
BACTERIA # UR AUTO: NEGATIVE /HPF — SIGNIFICANT CHANGE UP
BILIRUB UR-MCNC: NEGATIVE — SIGNIFICANT CHANGE UP
CAST: 1 /LPF — SIGNIFICANT CHANGE UP (ref 0–4)
COLOR SPEC: YELLOW — SIGNIFICANT CHANGE UP
DIFF PNL FLD: ABNORMAL
GLUCOSE UR QL: NEGATIVE MG/DL — SIGNIFICANT CHANGE UP
KETONES UR QL: NEGATIVE MG/DL — SIGNIFICANT CHANGE UP
LEUKOCYTE ESTERASE UR-ACNC: NEGATIVE — SIGNIFICANT CHANGE UP
NITRITE UR-MCNC: NEGATIVE — SIGNIFICANT CHANGE UP
PH UR: 5.5 — SIGNIFICANT CHANGE UP (ref 5–8)
PROT UR-MCNC: NEGATIVE MG/DL — SIGNIFICANT CHANGE UP
RBC CASTS # UR COMP ASSIST: 0 /HPF — SIGNIFICANT CHANGE UP (ref 0–4)
REVIEW: SIGNIFICANT CHANGE UP
SP GR SPEC: <1.005 — LOW (ref 1–1.03)
SQUAMOUS # UR AUTO: 0 /HPF — SIGNIFICANT CHANGE UP (ref 0–5)
UROBILINOGEN FLD QL: 0.2 MG/DL — SIGNIFICANT CHANGE UP (ref 0.2–1)
WBC UR QL: 0 /HPF — SIGNIFICANT CHANGE UP (ref 0–5)

## 2025-08-26 PROCEDURE — 87086 URINE CULTURE/COLONY COUNT: CPT

## 2025-08-26 PROCEDURE — 81001 URINALYSIS AUTO W/SCOPE: CPT

## 2025-08-26 PROCEDURE — 99283 EMERGENCY DEPT VISIT LOW MDM: CPT

## 2025-08-27 LAB
CULTURE RESULTS: SIGNIFICANT CHANGE UP
SPECIMEN SOURCE: SIGNIFICANT CHANGE UP